# Patient Record
Sex: MALE | Race: BLACK OR AFRICAN AMERICAN | NOT HISPANIC OR LATINO | Employment: FULL TIME | URBAN - METROPOLITAN AREA
[De-identification: names, ages, dates, MRNs, and addresses within clinical notes are randomized per-mention and may not be internally consistent; named-entity substitution may affect disease eponyms.]

---

## 2024-10-22 ENCOUNTER — OFFICE VISIT (OUTPATIENT)
Dept: FAMILY MEDICINE | Facility: CLINIC | Age: 29
End: 2024-10-22
Payer: COMMERCIAL

## 2024-10-22 ENCOUNTER — LAB VISIT (OUTPATIENT)
Dept: LAB | Facility: HOSPITAL | Age: 29
End: 2024-10-22
Attending: FAMILY MEDICINE
Payer: COMMERCIAL

## 2024-10-22 ENCOUNTER — PATIENT OUTREACH (OUTPATIENT)
Dept: ADMINISTRATIVE | Facility: HOSPITAL | Age: 29
End: 2024-10-22
Payer: COMMERCIAL

## 2024-10-22 VITALS
OXYGEN SATURATION: 97 % | RESPIRATION RATE: 18 BRPM | SYSTOLIC BLOOD PRESSURE: 134 MMHG | WEIGHT: 315 LBS | HEART RATE: 86 BPM | HEIGHT: 66 IN | DIASTOLIC BLOOD PRESSURE: 90 MMHG | BODY MASS INDEX: 50.62 KG/M2

## 2024-10-22 DIAGNOSIS — Z20.2 CONTACT WITH AND (SUSPECTED) EXPOSURE TO INFECTIONS WITH A PREDOMINANTLY SEXUAL MODE OF TRANSMISSION: ICD-10-CM

## 2024-10-22 DIAGNOSIS — Z21 ASYMPTOMATIC HIV INFECTION, WITH NO HISTORY OF HIV-RELATED ILLNESS: Primary | Chronic | ICD-10-CM

## 2024-10-22 DIAGNOSIS — Z23 NEED FOR VACCINATION: ICD-10-CM

## 2024-10-22 DIAGNOSIS — Z21 ASYMPTOMATIC HIV INFECTION, WITH NO HISTORY OF HIV-RELATED ILLNESS: ICD-10-CM

## 2024-10-22 DIAGNOSIS — Z01.84 IMMUNITY STATUS TESTING: ICD-10-CM

## 2024-10-22 DIAGNOSIS — Z13.220 ENCOUNTER FOR LIPID SCREENING FOR CARDIOVASCULAR DISEASE: ICD-10-CM

## 2024-10-22 DIAGNOSIS — Z13.6 ENCOUNTER FOR LIPID SCREENING FOR CARDIOVASCULAR DISEASE: ICD-10-CM

## 2024-10-22 DIAGNOSIS — Z11.1 SCREENING FOR TUBERCULOSIS: ICD-10-CM

## 2024-10-22 DIAGNOSIS — A53.9 SYPHILIS: ICD-10-CM

## 2024-10-22 DIAGNOSIS — A53.9 SYPHILIS: Chronic | ICD-10-CM

## 2024-10-22 LAB
ALBUMIN SERPL BCP-MCNC: 4 G/DL (ref 3.5–5.2)
ALP SERPL-CCNC: 56 U/L (ref 40–150)
ALT SERPL W/O P-5'-P-CCNC: 26 U/L (ref 10–44)
ANION GAP SERPL CALC-SCNC: 10 MMOL/L (ref 8–16)
AST SERPL-CCNC: 20 U/L (ref 10–40)
BILIRUB SERPL-MCNC: 0.4 MG/DL (ref 0.1–1)
BUN SERPL-MCNC: 11 MG/DL (ref 6–20)
CALCIUM SERPL-MCNC: 9.6 MG/DL (ref 8.7–10.5)
CHLORIDE SERPL-SCNC: 104 MMOL/L (ref 95–110)
CHOLEST SERPL-MCNC: 154 MG/DL (ref 120–199)
CHOLEST/HDLC SERPL: 3.9 {RATIO} (ref 2–5)
CO2 SERPL-SCNC: 26 MMOL/L (ref 23–29)
CREAT SERPL-MCNC: 0.8 MG/DL (ref 0.5–1.4)
EST. GFR  (NO RACE VARIABLE): >60 ML/MIN/1.73 M^2
ESTIMATED AVG GLUCOSE: 111 MG/DL (ref 68–131)
GLUCOSE SERPL-MCNC: 111 MG/DL (ref 70–110)
HAV IGG SER QL IA: NORMAL
HBA1C MFR BLD: 5.5 % (ref 4–5.6)
HBV CORE AB SERPL QL IA: NORMAL
HBV SURFACE AB SER-ACNC: 4.45 MIU/ML
HBV SURFACE AB SER-ACNC: NORMAL M[IU]/ML
HBV SURFACE AG SERPL QL IA: NORMAL
HCV AB SERPL QL IA: REACTIVE
HDLC SERPL-MCNC: 40 MG/DL (ref 40–75)
HDLC SERPL: 26 % (ref 20–50)
HIV 1 AB: NORMAL
HIV 2 AB: NEGATIVE
LDLC SERPL CALC-MCNC: 93.6 MG/DL (ref 63–159)
NONHDLC SERPL-MCNC: 114 MG/DL
POTASSIUM SERPL-SCNC: 3.7 MMOL/L (ref 3.5–5.1)
PROT SERPL-MCNC: 8.4 G/DL (ref 6–8.4)
RPR TITER: NORMAL
SODIUM SERPL-SCNC: 140 MMOL/L (ref 136–145)
TREPONEMA PALLIDUM AB: POSITIVE
TRIGL SERPL-MCNC: 102 MG/DL (ref 30–150)
TSH SERPL DL<=0.005 MIU/L-ACNC: 0.96 UIU/ML (ref 0.4–4)

## 2024-10-22 PROCEDURE — 3044F HG A1C LEVEL LT 7.0%: CPT | Mod: CPTII,S$GLB,, | Performed by: FAMILY MEDICINE

## 2024-10-22 PROCEDURE — 86704 HEP B CORE ANTIBODY TOTAL: CPT | Performed by: FAMILY MEDICINE

## 2024-10-22 PROCEDURE — 3080F DIAST BP >= 90 MM HG: CPT | Mod: CPTII,S$GLB,, | Performed by: FAMILY MEDICINE

## 2024-10-22 PROCEDURE — 36415 COLL VENOUS BLD VENIPUNCTURE: CPT | Mod: PN | Performed by: FAMILY MEDICINE

## 2024-10-22 PROCEDURE — 87491 CHLMYD TRACH DNA AMP PROBE: CPT | Mod: 59 | Performed by: FAMILY MEDICINE

## 2024-10-22 PROCEDURE — 81381 HLA I TYPING 1 ALLELE HR: CPT | Performed by: FAMILY MEDICINE

## 2024-10-22 PROCEDURE — 86787 VARICELLA-ZOSTER ANTIBODY: CPT | Performed by: FAMILY MEDICINE

## 2024-10-22 PROCEDURE — 99204 OFFICE O/P NEW MOD 45 MIN: CPT | Mod: 25,S$GLB,, | Performed by: FAMILY MEDICINE

## 2024-10-22 PROCEDURE — 86790 VIRUS ANTIBODY NOS: CPT | Performed by: FAMILY MEDICINE

## 2024-10-22 PROCEDURE — 86592 SYPHILIS TEST NON-TREP QUAL: CPT | Performed by: FAMILY MEDICINE

## 2024-10-22 PROCEDURE — 83036 HEMOGLOBIN GLYCOSYLATED A1C: CPT | Performed by: FAMILY MEDICINE

## 2024-10-22 PROCEDURE — 86765 RUBEOLA ANTIBODY: CPT | Performed by: FAMILY MEDICINE

## 2024-10-22 PROCEDURE — 87536 HIV-1 QUANT&REVRSE TRNSCRPJ: CPT | Performed by: FAMILY MEDICINE

## 2024-10-22 PROCEDURE — 86593 SYPHILIS TEST NON-TREP QUANT: CPT | Performed by: FAMILY MEDICINE

## 2024-10-22 PROCEDURE — 90471 IMMUNIZATION ADMIN: CPT | Mod: S$GLB,,, | Performed by: FAMILY MEDICINE

## 2024-10-22 PROCEDURE — 86361 T CELL ABSOLUTE COUNT: CPT | Performed by: FAMILY MEDICINE

## 2024-10-22 PROCEDURE — 86762 RUBELLA ANTIBODY: CPT | Performed by: FAMILY MEDICINE

## 2024-10-22 PROCEDURE — 3008F BODY MASS INDEX DOCD: CPT | Mod: CPTII,S$GLB,, | Performed by: FAMILY MEDICINE

## 2024-10-22 PROCEDURE — 90656 IIV3 VACC NO PRSV 0.5 ML IM: CPT | Mod: S$GLB,,, | Performed by: FAMILY MEDICINE

## 2024-10-22 PROCEDURE — 86735 MUMPS ANTIBODY: CPT | Performed by: FAMILY MEDICINE

## 2024-10-22 PROCEDURE — 82955 ASSAY OF G6PD ENZYME: CPT | Performed by: FAMILY MEDICINE

## 2024-10-22 PROCEDURE — 80053 COMPREHEN METABOLIC PANEL: CPT | Performed by: FAMILY MEDICINE

## 2024-10-22 PROCEDURE — 3075F SYST BP GE 130 - 139MM HG: CPT | Mod: CPTII,S$GLB,, | Performed by: FAMILY MEDICINE

## 2024-10-22 PROCEDURE — 86803 HEPATITIS C AB TEST: CPT | Performed by: FAMILY MEDICINE

## 2024-10-22 PROCEDURE — 99999 PR PBB SHADOW E&M-NEW PATIENT-LVL III: CPT | Mod: PBBFAC,,, | Performed by: FAMILY MEDICINE

## 2024-10-22 PROCEDURE — 87900 PHENOTYPE INFECT AGENT DRUG: CPT | Performed by: FAMILY MEDICINE

## 2024-10-22 PROCEDURE — 86706 HEP B SURFACE ANTIBODY: CPT | Performed by: FAMILY MEDICINE

## 2024-10-22 PROCEDURE — 80061 LIPID PANEL: CPT | Performed by: FAMILY MEDICINE

## 2024-10-22 PROCEDURE — 87591 N.GONORRHOEAE DNA AMP PROB: CPT | Mod: 59 | Performed by: FAMILY MEDICINE

## 2024-10-22 PROCEDURE — 84443 ASSAY THYROID STIM HORMONE: CPT | Performed by: FAMILY MEDICINE

## 2024-10-22 PROCEDURE — 87340 HEPATITIS B SURFACE AG IA: CPT | Performed by: FAMILY MEDICINE

## 2024-10-22 RX ORDER — BICTEGRAVIR SODIUM, EMTRICITABINE, AND TENOFOVIR ALAFENAMIDE FUMARATE 50; 200; 25 MG/1; MG/1; MG/1
1 TABLET ORAL DAILY
Qty: 30 TABLET | Refills: 1 | Status: ACTIVE | OUTPATIENT
Start: 2024-10-22

## 2024-10-23 PROBLEM — Z21 ASYMPTOMATIC HIV INFECTION, WITH NO HISTORY OF HIV-RELATED ILLNESS: Status: ACTIVE | Noted: 2024-10-23

## 2024-10-23 LAB
CD3+CD4+ CELLS # BLD: 652 CELLS/UL (ref 300–1400)
CD3+CD4+ CELLS NFR BLD: 24.2 % (ref 28–57)
G6PD RBC-CCNT: 9.7 U/G HB (ref 8–11.9)
HIV1 RNA # SERPL NAA+PROBE: ABNORMAL COPIES/ML
HIV1 RNA SERPL NAA+PROBE-LOG#: 4.43 LOG COPIES/ML
HIV1 RNA SERPL QL NAA+PROBE: DETECTED
MUMPS IGG INTERPRETATION: NEGATIVE
MUMPS IGG SCREEN: <5 AU/ML
RPR SER QL: REACTIVE
RPR SER-TITR: ABNORMAL {TITER}
RUBEOLA IGG ANTIBODY: >300 AU/ML
RUBEOLA INTERPRETATION: POSITIVE
VARICELLA INTERPRETATION: NEGATIVE
VARICELLA ZOSTER IGG: 86.9

## 2024-10-24 LAB
C TRACH RRNA SPEC QL NAA+PROBE: NEGATIVE
C TRACH RRNA SPEC QL NAA+PROBE: POSITIVE
N GONORRHOEA RRNA SPEC QL NAA+PROBE: NEGATIVE
N GONORRHOEA RRNA SPEC QL NAA+PROBE: NEGATIVE
N.GONORROHEAE, AMP RNA SOURCE: ABNORMAL
N.GONORROHEAE, AMP RNA SOURCE: NORMAL
RUBV IGG SER-ACNC: 10.2 IU/ML
RUBV IGG SER-IMP: REACTIVE
SPECIMEN SOURCE: ABNORMAL
SPECIMEN SOURCE: NORMAL

## 2024-10-27 PROBLEM — Z21 ASYMPTOMATIC HIV INFECTION, WITH NO HISTORY OF HIV-RELATED ILLNESS: Chronic | Status: ACTIVE | Noted: 2024-10-23

## 2024-10-27 PROBLEM — A53.9 SYPHILIS: Chronic | Status: ACTIVE | Noted: 2024-10-27

## 2024-11-01 LAB — HIV GENTYP ISLT: DETECTED

## 2024-11-03 DIAGNOSIS — A56.3 CHLAMYDIA TRACHOMATIS INFECTION OF ANUS AND RECTUM: Primary | ICD-10-CM

## 2024-11-03 DIAGNOSIS — A53.9 SYPHILIS: ICD-10-CM

## 2024-11-03 DIAGNOSIS — R76.8 HEPATITIS C ANTIBODY POSITIVE IN BLOOD: ICD-10-CM

## 2024-11-03 RX ORDER — DOXYCYCLINE HYCLATE 100 MG
100 TABLET ORAL 2 TIMES DAILY
Qty: 14 TABLET | Refills: 0 | Status: SHIPPED | OUTPATIENT
Start: 2024-11-03 | End: 2024-11-04

## 2024-11-04 ENCOUNTER — TELEPHONE (OUTPATIENT)
Dept: FAMILY MEDICINE | Facility: CLINIC | Age: 29
End: 2024-11-04
Payer: COMMERCIAL

## 2024-11-04 NOTE — TELEPHONE ENCOUNTER
Left voice message for patient to return call to discuss lab results.  ----- Message from Bert Jordan MD sent at 11/4/2024 12:04 AM CST -----  Please call patient let him know that his lab tests show positive rectal chlamydia.  I will send in a prescription for doxycycline to be taken twice a day for seven days. However, I need to know which is his local pharmacy.    Also labs showed positive hepatitis-C antibody test and we need to do further testing.  Would like him to have a hepatitis-C viral load, which is ordered, done in the next two weeks so results can be back by the time I see him.    Also, patient needs to be scheduled for nurse visits for weekly Bicillin 2.4 million units x 3 weeks.  Yumiko ordered this, please confirm with her when office will have it.    Hepatitis-C lab can be done when first Bicillin dose done

## 2024-11-04 NOTE — TELEPHONE ENCOUNTER
Local pharmacy is [facility] on [address] [place] [id number].----- Message from Med Assistant Sainz sent at 11/4/2024 11:30 AM CST -----  Type:  Patient Returning Call    Who Called:  Pt   Who Left Message for Patient:  Malorie Davis MA  Does the patient know what this is regarding?:    Would the patient rather a call back or a response via [facility]?  Call back  Callback   Best Call Back Number:  Telephone Information:  Mobile          [phone]     Additional Information:    Thank you.

## 2024-11-13 ENCOUNTER — PATIENT MESSAGE (OUTPATIENT)
Dept: RESEARCH | Facility: HOSPITAL | Age: 29
End: 2024-11-13
Payer: COMMERCIAL

## 2024-11-16 ENCOUNTER — PATIENT MESSAGE (OUTPATIENT)
Dept: ADMINISTRATIVE | Facility: OTHER | Age: 29
End: 2024-11-16
Payer: COMMERCIAL

## 2024-11-20 ENCOUNTER — LAB VISIT (OUTPATIENT)
Dept: LAB | Facility: HOSPITAL | Age: 29
End: 2024-11-20
Attending: FAMILY MEDICINE
Payer: COMMERCIAL

## 2024-11-20 DIAGNOSIS — R76.8 HEPATITIS C ANTIBODY POSITIVE IN BLOOD: ICD-10-CM

## 2024-11-20 PROCEDURE — 36415 COLL VENOUS BLD VENIPUNCTURE: CPT | Mod: PN | Performed by: FAMILY MEDICINE

## 2024-11-20 PROCEDURE — 87522 HEPATITIS C REVRS TRNSCRPJ: CPT | Performed by: FAMILY MEDICINE

## 2024-11-22 LAB
HCV RNA SERPL QL NAA+PROBE: NOT DETECTED
HCV RNA SPEC NAA+PROBE-ACNC: NOT DETECTED IU/ML

## 2024-11-29 ENCOUNTER — PATIENT MESSAGE (OUTPATIENT)
Dept: FAMILY MEDICINE | Facility: CLINIC | Age: 29
End: 2024-11-29
Payer: COMMERCIAL

## 2024-12-04 ENCOUNTER — LAB VISIT (OUTPATIENT)
Dept: LAB | Facility: HOSPITAL | Age: 29
End: 2024-12-04
Attending: FAMILY MEDICINE
Payer: COMMERCIAL

## 2024-12-04 ENCOUNTER — OFFICE VISIT (OUTPATIENT)
Dept: FAMILY MEDICINE | Facility: CLINIC | Age: 29
End: 2024-12-04
Payer: COMMERCIAL

## 2024-12-04 VITALS
RESPIRATION RATE: 18 BRPM | DIASTOLIC BLOOD PRESSURE: 78 MMHG | SYSTOLIC BLOOD PRESSURE: 114 MMHG | OXYGEN SATURATION: 98 % | HEART RATE: 85 BPM | WEIGHT: 315 LBS | BODY MASS INDEX: 50.62 KG/M2 | HEIGHT: 66 IN

## 2024-12-04 DIAGNOSIS — Z21 ASYMPTOMATIC HIV INFECTION, WITH NO HISTORY OF HIV-RELATED ILLNESS: Primary | Chronic | ICD-10-CM

## 2024-12-04 DIAGNOSIS — E66.813 CLASS 3 SEVERE OBESITY DUE TO EXCESS CALORIES WITH SERIOUS COMORBIDITY AND BODY MASS INDEX (BMI) OF 50.0 TO 59.9 IN ADULT: Chronic | ICD-10-CM

## 2024-12-04 DIAGNOSIS — E66.01 CLASS 3 SEVERE OBESITY DUE TO EXCESS CALORIES WITH SERIOUS COMORBIDITY AND BODY MASS INDEX (BMI) OF 50.0 TO 59.9 IN ADULT: Chronic | ICD-10-CM

## 2024-12-04 DIAGNOSIS — R76.8 HEPATITIS C ANTIBODY POSITIVE IN BLOOD: Chronic | ICD-10-CM

## 2024-12-04 DIAGNOSIS — Z21 ASYMPTOMATIC HIV INFECTION, WITH NO HISTORY OF HIV-RELATED ILLNESS: Chronic | ICD-10-CM

## 2024-12-04 DIAGNOSIS — A53.9 SYPHILIS: Chronic | ICD-10-CM

## 2024-12-04 LAB
ALBUMIN SERPL BCP-MCNC: 3.9 G/DL (ref 3.5–5.2)
ALP SERPL-CCNC: 54 U/L (ref 40–150)
ALT SERPL W/O P-5'-P-CCNC: 27 U/L (ref 10–44)
ANION GAP SERPL CALC-SCNC: 10 MMOL/L (ref 8–16)
AST SERPL-CCNC: 18 U/L (ref 10–40)
BASOPHILS # BLD AUTO: 0.03 K/UL (ref 0–0.2)
BASOPHILS NFR BLD: 0.4 % (ref 0–1.9)
BILIRUB SERPL-MCNC: 0.5 MG/DL (ref 0.1–1)
BUN SERPL-MCNC: 11 MG/DL (ref 6–20)
CALCIUM SERPL-MCNC: 9.7 MG/DL (ref 8.7–10.5)
CHLORIDE SERPL-SCNC: 100 MMOL/L (ref 95–110)
CO2 SERPL-SCNC: 28 MMOL/L (ref 23–29)
CREAT SERPL-MCNC: 0.8 MG/DL (ref 0.5–1.4)
DIFFERENTIAL METHOD BLD: NORMAL
EOSINOPHIL # BLD AUTO: 0.2 K/UL (ref 0–0.5)
EOSINOPHIL NFR BLD: 2.3 % (ref 0–8)
ERYTHROCYTE [DISTWIDTH] IN BLOOD BY AUTOMATED COUNT: 14 % (ref 11.5–14.5)
EST. GFR  (NO RACE VARIABLE): >60 ML/MIN/1.73 M^2
GLUCOSE SERPL-MCNC: 97 MG/DL (ref 70–110)
HCT VFR BLD AUTO: 42.8 % (ref 40–54)
HGB BLD-MCNC: 14 G/DL (ref 14–18)
IMM GRANULOCYTES # BLD AUTO: 0.02 K/UL (ref 0–0.04)
IMM GRANULOCYTES NFR BLD AUTO: 0.3 % (ref 0–0.5)
LYMPHOCYTES # BLD AUTO: 2.8 K/UL (ref 1–4.8)
LYMPHOCYTES NFR BLD: 38.5 % (ref 18–48)
MCH RBC QN AUTO: 29.7 PG (ref 27–31)
MCHC RBC AUTO-ENTMCNC: 32.7 G/DL (ref 32–36)
MCV RBC AUTO: 91 FL (ref 82–98)
MONOCYTES # BLD AUTO: 0.6 K/UL (ref 0.3–1)
MONOCYTES NFR BLD: 8 % (ref 4–15)
NEUTROPHILS # BLD AUTO: 3.7 K/UL (ref 1.8–7.7)
NEUTROPHILS NFR BLD: 50.5 % (ref 38–73)
NRBC BLD-RTO: 0 /100 WBC
PLATELET # BLD AUTO: 208 K/UL (ref 150–450)
PMV BLD AUTO: 10.1 FL (ref 9.2–12.9)
POTASSIUM SERPL-SCNC: 4 MMOL/L (ref 3.5–5.1)
PROT SERPL-MCNC: 8.4 G/DL (ref 6–8.4)
RBC # BLD AUTO: 4.72 M/UL (ref 4.6–6.2)
SODIUM SERPL-SCNC: 138 MMOL/L (ref 136–145)
WBC # BLD AUTO: 7.28 K/UL (ref 3.9–12.7)

## 2024-12-04 PROCEDURE — 99214 OFFICE O/P EST MOD 30 MIN: CPT | Mod: 25,S$GLB,, | Performed by: FAMILY MEDICINE

## 2024-12-04 PROCEDURE — 1159F MED LIST DOCD IN RCRD: CPT | Mod: CPTII,S$GLB,, | Performed by: FAMILY MEDICINE

## 2024-12-04 PROCEDURE — 99999 PR PBB SHADOW E&M-EST. PATIENT-LVL IV: CPT | Mod: PBBFAC,,, | Performed by: FAMILY MEDICINE

## 2024-12-04 PROCEDURE — 36415 COLL VENOUS BLD VENIPUNCTURE: CPT | Mod: PN | Performed by: FAMILY MEDICINE

## 2024-12-04 PROCEDURE — 3044F HG A1C LEVEL LT 7.0%: CPT | Mod: CPTII,S$GLB,, | Performed by: FAMILY MEDICINE

## 2024-12-04 PROCEDURE — 3008F BODY MASS INDEX DOCD: CPT | Mod: CPTII,S$GLB,, | Performed by: FAMILY MEDICINE

## 2024-12-04 PROCEDURE — 1160F RVW MEDS BY RX/DR IN RCRD: CPT | Mod: CPTII,S$GLB,, | Performed by: FAMILY MEDICINE

## 2024-12-04 PROCEDURE — 87536 HIV-1 QUANT&REVRSE TRNSCRPJ: CPT | Performed by: FAMILY MEDICINE

## 2024-12-04 PROCEDURE — 3074F SYST BP LT 130 MM HG: CPT | Mod: CPTII,S$GLB,, | Performed by: FAMILY MEDICINE

## 2024-12-04 PROCEDURE — 96372 THER/PROPH/DIAG INJ SC/IM: CPT | Mod: S$GLB,,, | Performed by: FAMILY MEDICINE

## 2024-12-04 PROCEDURE — 85025 COMPLETE CBC W/AUTO DIFF WBC: CPT | Performed by: FAMILY MEDICINE

## 2024-12-04 PROCEDURE — 80053 COMPREHEN METABOLIC PANEL: CPT | Performed by: FAMILY MEDICINE

## 2024-12-04 PROCEDURE — 86361 T CELL ABSOLUTE COUNT: CPT | Performed by: FAMILY MEDICINE

## 2024-12-04 PROCEDURE — 3078F DIAST BP <80 MM HG: CPT | Mod: CPTII,S$GLB,, | Performed by: FAMILY MEDICINE

## 2024-12-04 NOTE — PROGRESS NOTES
"  Patient Name: Cherie Moya    : 1995  MRN: [id number]      Subjective:     Patient ID: Cherie is a 29 y.o. male    Chief Complaint:  HIV Positive/AIDS    History of Present Illness    Cherie presents today for follow-up on HIV since starting treatment.    He reports this Biktarvy.    His recent labs were positive for syphilis and he is due for treatment      Review of Systems   Constitutional:  Negative for unexpected weight change.   HENT:  Negative for ear pain and sore throat.    Eyes:  Negative for visual disturbance.   Respiratory:  Negative for shortness of breath.    Cardiovascular:  Negative for chest pain.   Gastrointestinal:  Negative for abdominal pain and blood in stool.   Genitourinary:  Negative for dysuria and frequency.   Neurological:  Negative for weakness, numbness and headaches.   Hematological:  Negative for adenopathy.   Psychiatric/Behavioral:  Negative for suicidal ideas.         Objective:   /78 (BP Location: Left arm, Patient Position: Sitting)   Pulse 85   Resp 18   Ht 5' 6" (1.676 m)   Wt (!) 152.9 kg (337 lb 1.3 oz)   SpO2 98%   BMI 54.41 kg/m²     Physical Exam  Vitals reviewed.   Constitutional:       General: He is not in acute distress.  HENT:      Head: Normocephalic and atraumatic.      Right Ear: Ear canal and external ear normal.      Left Ear: Ear canal and external ear normal.      Nose: Nose normal.      Mouth/Throat:      Mouth: Mucous membranes are moist.      Pharynx: No oropharyngeal exudate or posterior oropharyngeal erythema.   Eyes:      Extraocular Movements: Extraocular movements intact.      Conjunctiva/sclera: Conjunctivae normal.      Pupils: Pupils are equal, round, and reactive to light.   Cardiovascular:      Rate and Rhythm: Normal rate and regular rhythm.      Pulses: Normal pulses.      Heart sounds: Normal heart sounds.   Pulmonary:      Effort: Pulmonary effort is normal. No respiratory distress.      Breath sounds: No wheezing or " rales.   Abdominal:      General: Abdomen is flat. Bowel sounds are normal. There is no distension.      Palpations: Abdomen is soft.      Tenderness: There is no abdominal tenderness. There is no guarding.   Musculoskeletal:      Cervical back: Normal range of motion. No rigidity or tenderness.   Lymphadenopathy:      Cervical: No cervical adenopathy.   Skin:     General: Skin is warm.      Capillary Refill: Capillary refill takes less than 2 seconds.   Neurological:      Mental Status: He is alert and oriented to person, place, and time.      Cranial Nerves: No cranial nerve deficit.      Sensory: No sensory deficit.   Psychiatric:         Mood and Affect: Mood normal.         Behavior: Behavior normal.           Lab Visit on 12/04/2024   Component Date Value Ref Range Status    HIV-1 ULTRAQUANT 12/04/2024 <20 (A)  <20 Copies/mL Final    Log HIV Copies/mL 12/04/2024 <1.30 (A)  <1.30 Log Copies/mL Final    HIV-1 RNA, QUALITATIVE 12/04/2024 Detected (A)  Not Detected Final    Comment: This procedure utilizes a real-time reverse-transcriptase polymerase  chain reaction test from [employer] to amplify a portion of the  haleigh/integrase region of the HIV-1 genome. The test may be used to aid  in assessing the viral response to antiretroviral treatment as  measured by changes in plasma HIV-1 RNA levels. This test should not  be used to establish a diagnosis of HIV-1 infection. The lower limit  of quantitation is <20 Copies/mL (<1.30 Log IU/mL)and the upper limit  of quantitation is 6 million Copies/mL (6.78 Log IU/mL).     Specimens reported as Detected but <20 Copies/mL contain detectable  levels of HIV-1 RNA but the viral load is below the limit of   quantitation. A Not Detected result does not rule out HIV-1   infection,   clinical correlation is recommended.      Sodium 12/04/2024 138  136 - 145 mmol/L Final    Potassium 12/04/2024 4.0  3.5 - 5.1 mmol/L Final    Chloride 12/04/2024 100  95 - 110 mmol/L Final     CO2 12/04/2024 28  23 - 29 mmol/L Final    Glucose 12/04/2024 97  70 - 110 mg/dL Final    BUN 12/04/2024 11  6 - 20 mg/dL Final    Creatinine 12/04/2024 0.8  0.5 - 1.4 mg/dL Final    Calcium 12/04/2024 9.7  8.7 - 10.5 mg/dL Final    Total Protein 12/04/2024 8.4  6.0 - 8.4 g/dL Final    Albumin 12/04/2024 3.9  3.5 - 5.2 g/dL Final    Total Bilirubin 12/04/2024 0.5  0.1 - 1.0 mg/dL Final    Comment: For infants and newborns, interpretation of results should be based  on gestational age, weight and in agreement with clinical  observations.    Premature Infant recommended reference ranges:  Up to 24 hours.............<8.0 mg/dL  Up to 48 hours............<12.0 mg/dL  3-5 days..................<15.0 mg/dL  6-29 days.................<15.0 mg/dL      Alkaline Phosphatase 12/04/2024 54  40 - 150 U/L Final    AST 12/04/2024 18  10 - 40 U/L Final    ALT 12/04/2024 27  10 - 44 U/L Final    eGFR 12/04/2024 >60  >60 mL/min/1.73 m^2 Final    Anion Gap 12/04/2024 10  8 - 16 mmol/L Final    CD4 % [place] T Cell 12/04/2024 26.8 (L)  28 - 57 % Final    Absolute CD4 12/04/2024 742  300 - 1400 cells/ul Final    Comment: This test was developed and its performance characteristics   determined by [facility].    It has not been cleared or approved by the U.S. Food and Drug   Administration. The FDA has determined that such clearance or   approval is not necessary.  This test is used for clinical purposes.    It should not be regarded as investigational or for research.  This   laboratory is certified under CLIA-88 as qualified to perform high   complexity clinical laboratory testing.      WBC 12/04/2024 7.28  3.90 - 12.70 K/uL Final    RBC 12/04/2024 4.72  4.60 - 6.20 M/uL Final    Hemoglobin 12/04/2024 14.0  14.0 - 18.0 g/dL Final    Hematocrit 12/04/2024 42.8  40.0 - 54.0 % Final    MCV 12/04/2024 91  82 - 98 fL Final    MCH 12/04/2024 29.7  27.0 - 31.0 pg Final    MCHC 12/04/2024 32.7  32.0 -  36.0 g/dL Final    RDW 12/04/2024 14.0  11.5 - 14.5 % Final    Platelets 12/04/2024 208  150 - 450 K/uL Final    MPV 12/04/2024 10.1  9.2 - 12.9 fL Final    Immature Granulocytes 12/04/2024 0.3  0.0 - 0.5 % Final    Gran # (ANC) 12/04/2024 3.7  1.8 - 7.7 K/uL Final    Immature Grans (Abs) 12/04/2024 0.02  0.00 - 0.04 K/uL Final    Comment: Mild elevation in immature granulocytes is non specific and   can be seen in a variety of conditions including stress response,   acute inflammation, trauma and pregnancy. Correlation with other   laboratory and clinical findings is essential.      Lymph # 12/04/2024 2.8  1.0 - 4.8 K/uL Final    Mono # 12/04/2024 0.6  0.3 - 1.0 K/uL Final    Eos # 12/04/2024 0.2  0.0 - 0.5 K/uL Final    Baso # 12/04/2024 0.03  0.00 - 0.20 K/uL Final    nRBC 12/04/2024 0  0 /100 WBC Final    Gran % 12/04/2024 50.5  38.0 - 73.0 % Final    Lymph % 12/04/2024 38.5  18.0 - 48.0 % Final    Mono % 12/04/2024 8.0  4.0 - 15.0 % Final    Eosinophil % 12/04/2024 2.3  0.0 - 8.0 % Final    Basophil % 12/04/2024 0.4  0.0 - 1.9 % Final    Differential Method 12/04/2024 Automated   Final   Lab Visit on 11/20/2024   Component Date Value Ref Range Status    HCV Quantitative Result 11/20/2024 Not Detected  <12 IU/mL Final    HCV, Qualitative 11/20/2024 Not Detected  Not Detected Final    Comment: This procedure utilizes a real-time polymerase chain reaction test  from [facility]. The amplification target is a conserved region   of the HCV genome. The lower limit of quantitation is <12 IU/mL   (<1.08 Log IU/mL)and the upper limit of quantitation is 30 million   IU/mL (7.48 Log IU/mL).     Specimens reported as Detected but <12 IU/mL contain detectable  levels of Hepatitis C RNA but the viral load is below the limit of   quantitation. A Not Detected result does not rule out HCV infection,   clinical correlation is recommended.     Lab Visit on 10/22/2024   Component Date Value Ref Range Status    Chlamydia,  Amplified DNA 10/22/2024 Not Detected  Not Detected Final    N gonorrhoeae, amplified DNA 10/22/2024 Not Detected  Not Detected Final   Lab Visit on 10/22/2024   Component Date Value Ref Range Status    NIL 10/22/2024 0.35490  IU/mL Final    Comment: The Nil tube value is used to determine if the patient   has a preexisting immune response which could cause a   false-positive reading on the test.   For a test to be valid, the NIL tube must have a value   of less than or equal to 8.0 IU/mL.  The mitogen control tube is used to assure the patient   has a healthy immune status and also serves as a control   for correct blood handling and incubation. It is used to   detect false negative readings.   The TB antigen tubes are coated with the M. tuberculosis   specific antigens. For a test to be considered positive,   at least one of the TB antigen tube values minus the Nil   tube value must be greater than or equal to 0.35 IU/mL   and be greater than or equal to 25% of the Nil tube value.  Diagnosing or excluding tuberculosis disease, and assessing   the probability of LTNI, requires a combination of   epidemiological, historical, medical, and diagnostic   findings that should be considered when interpreting   the test results.       TB1 - Nil 10/22/2024 -0.010  IU/mL Final    TB2 - Nil 10/22/2024 0.027  IU/mL Final    Mitogen - Nil 10/22/2024 9.842  IU/mL Final    TB Gold Plus 10/22/2024 Negative  Negative Final    M. tuberculosis infection NOT likely.   Lab Visit on 10/22/2024   Component Date Value Ref Range Status    CD4 % [place] T Cell 10/22/2024 24.2 (L)  28 - 57 % Final    Absolute CD4 10/22/2024 652  300 - 1400 cells/ul Final    Comment: This test was developed and its performance characteristics   determined by [facility].    It has not been cleared or approved by the U.S. Food and Drug   Administration. The FDA has determined that such clearance or   approval is not  necessary.  This test is used for clinical purposes.    It should not be regarded as investigational or for research.  This   laboratory is certified under CLIA-88 as qualified to perform high   complexity clinical laboratory testing.      Sodium 10/22/2024 140  136 - 145 mmol/L Final    Potassium 10/22/2024 3.7  3.5 - 5.1 mmol/L Final    Chloride 10/22/2024 104  95 - 110 mmol/L Final    CO2 10/22/2024 26  23 - 29 mmol/L Final    Glucose 10/22/2024 111 (H)  70 - 110 mg/dL Final    BUN 10/22/2024 11  6 - 20 mg/dL Final    Creatinine 10/22/2024 0.8  0.5 - 1.4 mg/dL Final    Calcium 10/22/2024 9.6  8.7 - 10.5 mg/dL Final    Total Protein 10/22/2024 8.4  6.0 - 8.4 g/dL Final    Albumin 10/22/2024 4.0  3.5 - 5.2 g/dL Final    Total Bilirubin 10/22/2024 0.4  0.1 - 1.0 mg/dL Final    Comment: For infants and newborns, interpretation of results should be based  on gestational age, weight and in agreement with clinical  observations.    Premature Infant recommended reference ranges:  Up to 24 hours.............<8.0 mg/dL  Up to 48 hours............<12.0 mg/dL  3-5 days..................<15.0 mg/dL  6-29 days.................<15.0 mg/dL      Alkaline Phosphatase 10/22/2024 56  40 - 150 U/L Final    AST 10/22/2024 20  10 - 40 U/L Final    ALT 10/22/2024 26  10 - 44 U/L Final    eGFR 10/22/2024 >60  >60 mL/min/1.73 m^2 Final    Anion Gap 10/22/2024 10  8 - 16 mmol/L Final    G6PD Quant 10/22/2024 9.7  8.0 - 11.9 U/g Hb Final    Comment: The G6PD activity level is expected to be decreased in the  setting of G6PD deficiency. However, G6PD enzyme activity   levels can be increased in the setting of reticulocytosis  or markedly elevated WBCs. Clinical correlation is required  to establish if there is a possibility of a masked G6PD   deficiency, particularly in the setting of ,   chronic, or episodic jaundice/anemia. In addition, enzyme  testing is not reliable in the setting of recent red cell  transfusion. If desired,  genotyping is available G6PDZ/  G6PD Full Gene Sequencing, V.    -------------------ADDITIONAL INFORMATION-------------------  This test was developed and its performance characteristics   determined by [facility] in a manner consistent with   CLIA requirements. This test has not been cleared or   approved by the U.S. Food and Drug Administration.    Test Performed by:  [facility] - [address] [address]  : Lilly Hernandez Ph.D.;                            CLIA# [id number]      Hemoglobin A1C 10/22/2024 5.5  4.0 - 5.6 % Final    Comment: ADA Screening Guidelines:  5.7-6.4%  Consistent with prediabetes  >or=6.5%  Consistent with diabetes    High levels of fetal hemoglobin interfere with the HbA1C  assay. Heterozygous hemoglobin variants (HbS, HgC, etc)do  not significantly interfere with this assay.   However, presence of multiple variants may affect accuracy.      Estimated Avg Glucose 10/22/2024 111  68 - 131 mg/dL Final    Hepatitis A Antibody IgG 10/22/2024 Non-reactive   Final    IgG anti-HAV not detected.    Hep B Core Total Ab 10/22/2024 Non-reactive  Non-reactive Final    Hep B S Ab 10/22/2024 4.45  mIU/mL Final    Hep B S Ab 10/22/2024 Non-reactive   Final    Individual is considered not immune to HBV infection.    Hepatitis B Surface Ag 10/22/2024 Non-reactive  Non-reactive Final    Hepatitis C Ab 10/22/2024 Reactive (A)  Non-reactive Final    Comment: Presumptive evidence of antibodies to HCV; recommend   supplemental testing HCV RNA Quantitative by PCR   ([id number]) if clinically indicated.      HIV-1 ULTRAQUANT 10/22/2024 66243 (A)  <20 Copies/mL Final    Log HIV Copies/mL 10/22/2024 4.43 (A)  <1.30 Log Copies/mL Final    HIV-1 RNA, QUALITATIVE 10/22/2024 Detected (A)  Not Detected Final    Comment: This procedure utilizes a real-time reverse-transcriptase polymerase  chain reaction test from [employer] to amplify a portion  of the  haleigh/integrase region of the HIV-1 genome. The test may be used to aid  in assessing the viral response to antiretroviral treatment as  measured by changes in plasma HIV-1 RNA levels. This test should not  be used to establish a diagnosis of HIV-1 infection. The lower limit  of quantitation is <20 Copies/mL (<1.30 Log IU/mL)and the upper limit  of quantitation is 6 million Copies/mL (6.78 Log IU/mL).     Specimens reported as Detected but <20 Copies/mL contain detectable  levels of HIV-1 RNA but the viral load is below the limit of   quantitation. A Not Detected result does not rule out HIV-1   infection,   clinical correlation is recommended.      Cholesterol 10/22/2024 154  120 - 199 mg/dL Final    Comment: The National Cholesterol Education Program (NCEP) has set the  following guidelines (reference ranges) for Cholesterol:  Optimal.....................<200 mg/dL  Borderline High.............200-239 mg/dL  High........................> or = 240 mg/dL      Triglycerides 10/22/2024 102  30 - 150 mg/dL Final    Comment: The National Cholesterol Education Program (NCEP) has set the  following guidelines (reference values) for triglycerides:  Normal......................<150 mg/dL  Borderline High.............150-199 mg/dL  High........................200-499 mg/dL      HDL 10/22/2024 40  40 - 75 mg/dL Final    Comment: The National Cholesterol Education Program (NCEP) has set the  following guidelines (reference values) for HDL Cholesterol:  Low...............<40 mg/dL  Optimal...........>60 mg/dL      LDL Cholesterol 10/22/2024 93.6  63.0 - 159.0 mg/dL Final    Comment: The National Cholesterol Education Program (NCEP) has set the  following guidelines (reference values) for LDL Cholesterol:  Optimal.......................<130 mg/dL  Borderline High...............130-159 mg/dL  High..........................160-189 mg/dL  Very High.....................>190 mg/dL      HDL/Cholesterol Ratio 10/22/2024 26.0   20.0 - 50.0 % Final    Total Cholesterol/HDL Ratio 10/22/2024 3.9  2.0 - 5.0 Final    Non-HDL Cholesterol 10/22/2024 114  mg/dL Final    Comment: Risk category and Non-HDL cholesterol goals:  Coronary heart disease (CHD)or equivalent (10-year risk of CHD >20%):  Non-HDL cholesterol goal     <130 mg/dL  Two or more CHD risk factors and 10-year risk of CHD <= 20%:  Non-HDL cholesterol goal     <160 mg/dL  0 to 1 CHD risk factor:  Non-HDL cholesterol goal     <190 mg/dL      Mumps IgG Screen 10/22/2024 <5.00  AU/ml Final    Mumps IgG Interpretation 10/22/2024 Negative   Final    Comment: Absence of detectable mumps virus IgG antibodies. A   negative result generally indicates that the patient   has not been infected and is susceptible to mumps.   If the subject has no history of mumps, has not   been previously vaccinated and exposure to mumps   virus is suspected despite a negative finding, a   second sample should be collected and tested no   less than one to two weeks later.       Rubeola IgG 10/22/2024 >300.00  AU/ml Final    Rubeola Interpretation 10/22/2024 Positive   Final    Comment: Presence of detectable measles virus IgG antibodies.   A positive result generally indicates exposure to   measles virus or previous vaccination.      Rubella IgG Antibodies 10/22/2024 10.2  >=10.0 IU/mL Final    Rubella Immune Status 10/22/2024 Reactive   Final    Comment: 0.0-4.9 IU/mL  Nonreactive (Non-Immune)  5.0-9.9 IU/mL  Indeterminate  10.0-400.0 IU/mL Reactive (Immune)    These results were obtained with the IMMULITE 2000 Rubella  Quantitative IgG assay. Values obtained from other   manufacturers' assay methods may not be used interchangeably.       TSH 10/22/2024 0.960  0.400 - 4.000 uIU/mL Final    Varicella Zoster IgG 10/22/2024 86.90   Final    Varicella Interpretation 10/22/2024 Negative   Final    Comment: The assay performance in detecting antibodies to VZV   in individuals vaccinated with the FDA-licensed  VZV  vaccine is unknown.  Absence of detectable VZV IgG antibodies. A negative   result indicates no detectable VZV antibody but does not   rule out acute infection. It should be noted that   the test usually scores negative in infected patients   during the incubation period and the early stages of   infection. If exposure to varicella-zoster virus is   suspected, despite a negative finding, a second sample   should be collected and tested no less than one or   two weeks later.       Interpretation 10/22/2024    Final                    Value:TAQ: [id number]  SAPE: 222       Testing Date 10/22/2024 11/04/2024 07:45 AM   Final     Result 10/22/2024 Negative   Final    Comment: These tests are not cleared or approved by the U.S. FDA, but such   approval is not required since this laboratory is certified by CLIA   (#[id number]) and the American Society for Histocompatibility and   Immunogenetics (09-3-PI-02-01) to perform high complexity testing.    [facility] is under the direction of TRISTAN Kim MD, TOREY.   Details of test procedures may be obtained by calling the Laboratory   at  [phone].  HLA typing was performed by SSO (immunofluorescent detection) and/or   PCR amplification SSP/RT-PCR molecular techniques. These tests have   been developed by [employer] (a division of [employer])   and/or [employer]( A division of [facility]).  All procedures and reagents   have been validated and performance characteristics determined, by   [facility].    Documentation available upon request.       RPR 10/22/2024 Reactive (A)   Final    HIV-1 GenotypeR PLUS 10/22/2024 DETECTED (A)   Final    Comment: HIV Subtype: B  ___________________________________________________________  Antiretroviral drugs      Resistance  Mutations  Detected  Predicted  ___________________________________________________________    NRTIs                            ZDV (zidovudine or Retrovir)      NO       ABC (abacavir or Ziagen)          NO       ddI (didanosine or Videx)         NO       3TC (lamivudine or Epivir)        NO       FTC (emtricitabine or Emtriva)    NO       d4T (stavudine or Zerit)          NO       TDF (tenofovir or Viread)         NO       ________________________________ ___ ______________________    NNRTIs                           ETR (etravirine or [employer]ence)     NO       EFV (efavirenz or Sustiva)        NO       NVP (nevirapine or Viramune)      NO       RPV (rilpivirine or Edurant)      NO       SHANA (doravirine or Pifeltro)      NO       ________________________________ ___ ______________________    PIs                              FPV (fos-amprenavir or Lexiva)    NO       IDV (in                           dinavir or Crixivan)       NO       NFV (nelfinavir or Viracept)      NO       SQV (saquinavir or Invirase)      NO       LPV (lopinavir or Kaletra)        NO       ATV (atazanavir or Reyataz)       NO       TPV (tipranavir or Aptivus)       NO       DRV (darunavir or Prezista)       NO         ________________________________ ___ ______________________  PRB = PROBABLE OR EMERGING RESISTANCE  OTHER MUTATIONS DETECTED:  RT GENE MUTATIONS: R211K  IN GENE MUTATIONS: I13V,L63P,I64V/L,A71T/A,V77I  ___________________________________________________________  The [employer] Sep 2022 Interpretation Algorithm  The method used in this test is RT-PCR and sequencing   of the HIV-1 polymerase gene.    The phrases  resistance predicted  and  probable   or emerging resistance  refer to the application of   the interpretive rules. The FDA has not reviewed  all of the interpretive rules used by the laboratory   to predict drug resistance. FDA may not currently   recognize some of the HIV gene mu                           tations reported as    predictive of drug resistance, but the laboratory   considers these mutations to be associated with   resistance to anti-viral drugs based on current   clinical or scientific studies. The test has been   validated pursuant to CLIA regulations and is not   considered investigational or for research use only.  Treatment decisions should be made in consideration of   all relevant clinical and laboratory findings and the  prescribing information for the drugs.    This test was developed and its analytical performance  characteristics have been determined by [employer].  It has not been cleared or approved by FDA. This assay  has been validated pursuant to the CLIA regulations  and is used for clinical purposes.    Test Performed at:  [facility] [facility]  [address] [place]  [address]     REDD Rivas MD, PhD      OPAL obrien RNA Source 10/22/2024 THROAT   Corrected    C.trach. Misc. Amp RNA 10/22/2024 Negative  Negative Final    Comment: -------------------ADDITIONAL INFORMATION-------------------  This report is intended for use in clinical monitoring   and management of patients. It is not intended for use   in medical-legal applications.       Becky RNA Source 10/22/2024 THROAT   Corrected    N.gonorroheae,Misc. Amp RNA 10/22/2024 Negative  Negative Final    Comment: -------------------ADDITIONAL INFORMATION-------------------  This report is intended for use in clinical monitoring   and management of patients. It is not intended for use   in medical-legal applications.     Test Performed by:  [facility]  [address] [place] [address]  : Lilly Hernandez Ph.D.; CLIA# [id number]      OPAL obrien RNA Source 10/22/2024 RECTAL   Corrected    C.trach. Misc. Amp RNA 10/22/2024 Positive (A)  Negative Final    Comment: -------------------ADDITIONAL INFORMATION-------------------  This report is intended  for use in clinical monitoring   and management of patients. It is not intended for use   in medical-legal applications.       Shantelsharon, RNA Source 10/22/2024 RECTAL   Corrected    ShantelsharonMisc. Amp RNA 10/22/2024 Negative  Negative Final    Comment: -------------------ADDITIONAL INFORMATION-------------------  This report is intended for use in clinical monitoring   and management of patients. It is not intended for use   in medical-legal applications.     Test Performed by:  [facility]  [address] [place] [address]  : Lilly Hernandez Ph.D.; CLIA# [id number]      RPR Quantitative 10/22/2024 1:4 (A)   Final   Office Visit on 10/22/2024   Component Date Value Ref Range Status    HIV 1 Ab 10/08/2024 Posititive   Final    HIV 2 Ab 10/08/2024 Negative   Final    RPR Titer 10/08/2024 1:4   Final    TREPONEMA PALLIDUM AB 10/08/2024 Positive   Final         Assessment        ICD-10-CM ICD-9-CM   1. Asymptomatic HIV infection, with no history of HIV-related illness  Z21 V08   2. Class 3 severe obesity due to excess calories with serious comorbidity and body mass index (BMI) of 50.0 to 59.9 in adult  E66.813 278.01    Z68.43 V85.43    E66.01    3. Hepatitis C antibody positive in blood  R76.8 795.79   4. Syphilis  A53.9 097.9         Plan:   Assessment & Plan    IMPRESSION:  Assessed patient's response to HIV treatment with Biktarvy  Reviewed lab results: CD4 count 600, excellent kidney and liver function, negative tuberculosis test  Evaluated hepatitis C status: likely cleared infection or possible false positive  Determined no resistance to main HIV medications based on resistance test  Proceeded with syphilis treatment using Bicillin (penicillin) injections due to potential doxycycline resistance  Deferred mumps vaccination due to ongoing syphilis treatment and live vaccine concerns    HIV DISEASE:  Discussed CD4 count implications and target  range.  Educated on HIV medication resistance and its implications.  Continued Biktarvy for HIV treatment.  Viral load retest ordered.    SYPHILIS:  Informed about syphilis stages and treatment approach.  Explained rationale for Bicillin injections over doxycycline for syphilis treatment.  Started Bicillin (penicillin) injections for syphilis treatment, to be administered weekly for 3 weeks.  Administered first Bicillin injection.    HEPATITIS C ANTIBODY POSITIVE:  Explained hepatitis C test results and possible scenarios.            1. Asymptomatic HIV infection, with no history of HIV-related illness  -     HIV RNA, Quantitative, PCR; Future; Expected date: 12/04/2024  -     Comprehensive Metabolic Panel; Future; Expected date: 12/04/2024  -     CD4 T-Gasquet Cells; Future; Expected date: 12/04/2024  -     CBC Auto Differential; Future; Expected date: 12/04/2024    2. Class 3 severe obesity due to excess calories with serious comorbidity and body mass index (BMI) of 50.0 to 59.9 in adult    3. Hepatitis C antibody positive in blood    4. Syphilis             -Bert Jordan Jr., MD, AAHIVS      This note was generated with the assistance of ambient listening technology. Verbal consent was obtained by the patient and accompanying visitor(s) for the recording of patient appointment to facilitate this note. I attest to having reviewed and edited the generated note for accuracy, though some syntax or spelling errors may persist. Please contact the author of this note for any clarification.      There are no Patient Instructions on file for this visit.      Follow up in about 1 week (around 12/11/2024) for Nurse Visit- Bicillin 2.4 mill dose 2; 2 weeks Nurse Visit Bicillin 2.4 mill dose 3.   Future Appointments   Date Time Provider Department Center   12/11/2024  9:20 AM NURSE, [facility] [place] [facility]   12/18/2024  9:20 AM NURSE, [facility] [place] [facility]

## 2024-12-06 LAB
CD3+CD4+ CELLS # BLD: 742 CELLS/UL (ref 300–1400)
CD3+CD4+ CELLS NFR BLD: 26.8 % (ref 28–57)
HIV1 RNA # SERPL NAA+PROBE: <20 COPIES/ML
HIV1 RNA SERPL NAA+PROBE-LOG#: <1.3 LOG COPIES/ML
HIV1 RNA SERPL QL NAA+PROBE: DETECTED

## 2024-12-08 PROBLEM — R76.8 HEPATITIS C ANTIBODY POSITIVE IN BLOOD: Chronic | Status: ACTIVE | Noted: 2024-12-08

## 2024-12-08 PROBLEM — E66.813 CLASS 3 SEVERE OBESITY DUE TO EXCESS CALORIES WITH SERIOUS COMORBIDITY AND BODY MASS INDEX (BMI) OF 50.0 TO 59.9 IN ADULT: Chronic | Status: ACTIVE | Noted: 2024-12-08

## 2024-12-08 PROBLEM — E66.01 CLASS 3 SEVERE OBESITY DUE TO EXCESS CALORIES WITH SERIOUS COMORBIDITY AND BODY MASS INDEX (BMI) OF 50.0 TO 59.9 IN ADULT: Chronic | Status: ACTIVE | Noted: 2024-12-08

## 2024-12-11 ENCOUNTER — CLINICAL SUPPORT (OUTPATIENT)
Dept: FAMILY MEDICINE | Facility: CLINIC | Age: 29
End: 2024-12-11
Payer: COMMERCIAL

## 2024-12-11 DIAGNOSIS — A53.9 SYPHILIS: Primary | Chronic | ICD-10-CM

## 2024-12-11 PROCEDURE — 99999 PR PBB SHADOW E&M-EST. PATIENT-LVL I: CPT | Mod: PBBFAC,,,

## 2024-12-11 PROCEDURE — 96372 THER/PROPH/DIAG INJ SC/IM: CPT | Mod: S$GLB,,, | Performed by: FAMILY MEDICINE

## 2024-12-11 NOTE — PROGRESS NOTES
Second dose of Bicillin 2.4 million units given to the right dorsal gluteal IM. Instructed to wait 15 minutes per monitoring. No adverse drug reaction noted.

## 2024-12-13 DIAGNOSIS — Z21 ASYMPTOMATIC HIV INFECTION, WITH NO HISTORY OF HIV-RELATED ILLNESS: Chronic | ICD-10-CM

## 2024-12-13 NOTE — TELEPHONE ENCOUNTER
Refill Routing Note   Medication(s) are not appropriate for processing by [facility] for the following reason(s):        Outside of protocol    [facility] action(s):  Route               Appointments  past 12m or future 3m with PCP    Date Provider   Last Visit   12/4/2024 Bert Jordan Jr., MD   Next Visit   Visit date not found Bert Jordan Jr., MD   ED visits in past 90 days: 0        Note composed:4:32 PM 12/13/2024

## 2024-12-14 RX ORDER — BICTEGRAVIR SODIUM, EMTRICITABINE, AND TENOFOVIR ALAFENAMIDE FUMARATE 50; 200; 25 MG/1; MG/1; MG/1
1 TABLET ORAL DAILY
Qty: 30 TABLET | Refills: 5 | Status: ACTIVE | OUTPATIENT
Start: 2024-12-14

## 2024-12-18 ENCOUNTER — CLINICAL SUPPORT (OUTPATIENT)
Dept: FAMILY MEDICINE | Facility: CLINIC | Age: 29
End: 2024-12-18
Payer: COMMERCIAL

## 2024-12-18 DIAGNOSIS — A53.9 SYPHILIS: Primary | ICD-10-CM

## 2024-12-18 PROCEDURE — 99999 PR PBB SHADOW E&M-EST. PATIENT-LVL I: CPT | Mod: PBBFAC,,,

## 2024-12-18 PROCEDURE — 96372 THER/PROPH/DIAG INJ SC/IM: CPT | Mod: S$GLB,,, | Performed by: FAMILY MEDICINE

## 2024-12-18 NOTE — PROGRESS NOTES
Bicillin 2.4 million units given to the left dorsal gluteal IM. Instructed patient to wait 15 minutes per monitoring. No adverse drug reaction noted.

## 2025-02-19 ENCOUNTER — PATIENT MESSAGE (OUTPATIENT)
Dept: ADMINISTRATIVE | Facility: OTHER | Age: 30
End: 2025-02-19
Payer: COMMERCIAL

## 2025-03-18 ENCOUNTER — PATIENT MESSAGE (OUTPATIENT)
Dept: ADMINISTRATIVE | Facility: OTHER | Age: 30
End: 2025-03-18
Payer: COMMERCIAL

## 2025-04-20 ENCOUNTER — PATIENT MESSAGE (OUTPATIENT)
Dept: ADMINISTRATIVE | Facility: OTHER | Age: 30
End: 2025-04-20
Payer: COMMERCIAL

## 2025-05-21 ENCOUNTER — PATIENT MESSAGE (OUTPATIENT)
Dept: ADMINISTRATIVE | Facility: OTHER | Age: 30
End: 2025-05-21
Payer: COMMERCIAL

## 2025-05-21 NOTE — TELEPHONE ENCOUNTER
Outgoing call regarding patient's questionnaire as refill had been set up prior to questionnaire for different date. Patient reported delivery date in questionnaire was wrong and confirmed delivery date of 5/28 was correct.

## 2025-06-19 DIAGNOSIS — Z21 ASYMPTOMATIC HIV INFECTION, WITH NO HISTORY OF HIV-RELATED ILLNESS: Chronic | ICD-10-CM

## 2025-06-20 DIAGNOSIS — Z21 ASYMPTOMATIC HIV INFECTION, WITH NO HISTORY OF HIV-RELATED ILLNESS: Chronic | ICD-10-CM

## 2025-06-20 DIAGNOSIS — Z20.2 CONTACT WITH AND (SUSPECTED) EXPOSURE TO INFECTIONS WITH A PREDOMINANTLY SEXUAL MODE OF TRANSMISSION: ICD-10-CM

## 2025-06-20 DIAGNOSIS — A53.9 SYPHILIS: Chronic | ICD-10-CM

## 2025-06-20 RX ORDER — BICTEGRAVIR SODIUM, EMTRICITABINE, AND TENOFOVIR ALAFENAMIDE FUMARATE 50; 200; 25 MG/1; MG/1; MG/1
1 TABLET ORAL DAILY
Qty: 30 TABLET | Refills: 1 | Status: SHIPPED | OUTPATIENT
Start: 2025-06-20

## 2025-06-23 ENCOUNTER — TELEPHONE (OUTPATIENT)
Dept: FAMILY MEDICINE | Facility: CLINIC | Age: 30
End: 2025-06-23
Payer: COMMERCIAL

## 2025-06-26 ENCOUNTER — LAB VISIT (OUTPATIENT)
Dept: LAB | Facility: HOSPITAL | Age: 30
End: 2025-06-26
Attending: FAMILY MEDICINE
Payer: COMMERCIAL

## 2025-06-26 DIAGNOSIS — Z21 ASYMPTOMATIC HIV INFECTION, WITH NO HISTORY OF HIV-RELATED ILLNESS: ICD-10-CM

## 2025-06-26 DIAGNOSIS — A53.9 SYPHILIS: Chronic | ICD-10-CM

## 2025-06-26 DIAGNOSIS — Z20.2 CONTACT WITH AND (SUSPECTED) EXPOSURE TO INFECTIONS WITH A PREDOMINANTLY SEXUAL MODE OF TRANSMISSION: ICD-10-CM

## 2025-06-26 LAB
ABSOLUTE EOSINOPHIL (OHS): 0.24 K/UL
ABSOLUTE MONOCYTE (OHS): 0.59 K/UL (ref 0.3–1)
ABSOLUTE NEUTROPHIL COUNT (OHS): 3.26 K/UL (ref 1.8–7.7)
ALBUMIN SERPL BCP-MCNC: 3.8 G/DL (ref 3.5–5.2)
ALP SERPL-CCNC: 54 UNIT/L (ref 40–150)
ALT SERPL W/O P-5'-P-CCNC: 15 UNIT/L (ref 10–44)
ANION GAP (OHS): 10 MMOL/L (ref 8–16)
AST SERPL-CCNC: 13 UNIT/L (ref 11–45)
BASOPHILS # BLD AUTO: 0.02 K/UL
BASOPHILS NFR BLD AUTO: 0.3 %
BILIRUB SERPL-MCNC: 0.5 MG/DL (ref 0.1–1)
BUN SERPL-MCNC: 14 MG/DL (ref 6–20)
CALCIUM SERPL-MCNC: 9.3 MG/DL (ref 8.7–10.5)
CHLORIDE SERPL-SCNC: 102 MMOL/L (ref 95–110)
CO2 SERPL-SCNC: 27 MMOL/L (ref 23–29)
CREAT SERPL-MCNC: 0.8 MG/DL (ref 0.5–1.4)
ERYTHROCYTE [DISTWIDTH] IN BLOOD BY AUTOMATED COUNT: 13.1 % (ref 11.5–14.5)
GFR SERPLBLD CREATININE-BSD FMLA CKD-EPI: >60 ML/MIN/1.73/M2
GLUCOSE SERPL-MCNC: 101 MG/DL (ref 70–110)
HCT VFR BLD AUTO: 41.1 % (ref 40–54)
HGB BLD-MCNC: 14 GM/DL (ref 14–18)
IMM GRANULOCYTES # BLD AUTO: 0.01 K/UL (ref 0–0.04)
IMM GRANULOCYTES NFR BLD AUTO: 0.1 % (ref 0–0.5)
LYMPHOCYTES # BLD AUTO: 3.32 K/UL (ref 1–4.8)
MCH RBC QN AUTO: 30.9 PG (ref 27–31)
MCHC RBC AUTO-ENTMCNC: 34.1 G/DL (ref 32–36)
MCV RBC AUTO: 91 FL (ref 82–98)
NUCLEATED RBC (/100WBC) (OHS): 0 /100 WBC
PLATELET # BLD AUTO: 244 K/UL (ref 150–450)
PMV BLD AUTO: 10.7 FL (ref 9.2–12.9)
POTASSIUM SERPL-SCNC: 3.8 MMOL/L (ref 3.5–5.1)
PROT SERPL-MCNC: 8.2 GM/DL (ref 6–8.4)
RBC # BLD AUTO: 4.53 M/UL (ref 4.6–6.2)
RELATIVE EOSINOPHIL (OHS): 3.2 %
RELATIVE LYMPHOCYTE (OHS): 44.6 % (ref 18–48)
RELATIVE MONOCYTE (OHS): 7.9 % (ref 4–15)
RELATIVE NEUTROPHIL (OHS): 43.9 % (ref 38–73)
SODIUM SERPL-SCNC: 139 MMOL/L (ref 136–145)
WBC # BLD AUTO: 7.44 K/UL (ref 3.9–12.7)

## 2025-06-26 PROCEDURE — 87536 HIV-1 QUANT&REVRSE TRNSCRPJ: CPT

## 2025-06-26 PROCEDURE — 85025 COMPLETE CBC W/AUTO DIFF WBC: CPT

## 2025-06-26 PROCEDURE — 86361 T CELL ABSOLUTE COUNT: CPT

## 2025-06-26 PROCEDURE — 36415 COLL VENOUS BLD VENIPUNCTURE: CPT | Mod: PN

## 2025-06-26 PROCEDURE — 84075 ASSAY ALKALINE PHOSPHATASE: CPT

## 2025-06-26 PROCEDURE — 86592 SYPHILIS TEST NON-TREP QUAL: CPT

## 2025-06-27 LAB
CD3+CD4+ CELLS # SPEC: 994 CELLS/UL (ref 300–1400)
CD3+CD4+ CELLS NFR BLD: 26.48 % (ref 28–57)
HIV1 RNA SERPL NAA+PROBE-LOG#: NOT DETECTED {LOG_COPIES}/ML
LABORATORY COMMENT REPORT: ABNORMAL

## 2025-06-28 LAB — RPR SER QL: NORMAL

## 2025-06-29 ENCOUNTER — RESULTS FOLLOW-UP (OUTPATIENT)
Dept: FAMILY MEDICINE | Facility: CLINIC | Age: 30
End: 2025-06-29

## 2025-07-02 ENCOUNTER — OFFICE VISIT (OUTPATIENT)
Dept: FAMILY MEDICINE | Facility: CLINIC | Age: 30
End: 2025-07-02
Payer: COMMERCIAL

## 2025-07-02 VITALS
DIASTOLIC BLOOD PRESSURE: 76 MMHG | SYSTOLIC BLOOD PRESSURE: 114 MMHG | WEIGHT: 315 LBS | HEART RATE: 81 BPM | BODY MASS INDEX: 50.62 KG/M2 | OXYGEN SATURATION: 98 % | RESPIRATION RATE: 19 BRPM | TEMPERATURE: 98 F | HEIGHT: 66 IN

## 2025-07-02 DIAGNOSIS — R06.83 SNORING: ICD-10-CM

## 2025-07-02 DIAGNOSIS — Z21 ASYMPTOMATIC HIV INFECTION, WITH NO HISTORY OF HIV-RELATED ILLNESS: Primary | Chronic | ICD-10-CM

## 2025-07-02 DIAGNOSIS — Z20.2 CONTACT WITH AND (SUSPECTED) EXPOSURE TO INFECTIONS WITH A PREDOMINANTLY SEXUAL MODE OF TRANSMISSION: ICD-10-CM

## 2025-07-02 PROCEDURE — 3074F SYST BP LT 130 MM HG: CPT | Mod: CPTII,S$GLB,, | Performed by: FAMILY MEDICINE

## 2025-07-02 PROCEDURE — 1160F RVW MEDS BY RX/DR IN RCRD: CPT | Mod: CPTII,S$GLB,, | Performed by: FAMILY MEDICINE

## 2025-07-02 PROCEDURE — 1159F MED LIST DOCD IN RCRD: CPT | Mod: CPTII,S$GLB,, | Performed by: FAMILY MEDICINE

## 2025-07-02 PROCEDURE — 3008F BODY MASS INDEX DOCD: CPT | Mod: CPTII,S$GLB,, | Performed by: FAMILY MEDICINE

## 2025-07-02 PROCEDURE — 99213 OFFICE O/P EST LOW 20 MIN: CPT | Mod: S$GLB,,, | Performed by: FAMILY MEDICINE

## 2025-07-02 PROCEDURE — 99999 PR PBB SHADOW E&M-EST. PATIENT-LVL V: CPT | Mod: PBBFAC,,, | Performed by: FAMILY MEDICINE

## 2025-07-02 PROCEDURE — 3078F DIAST BP <80 MM HG: CPT | Mod: CPTII,S$GLB,, | Performed by: FAMILY MEDICINE

## 2025-07-02 RX ORDER — FLUTICASONE PROPIONATE 50 MCG
SPRAY, SUSPENSION (ML) NASAL
COMMUNITY
Start: 2025-04-20

## 2025-07-02 RX ORDER — GLYCOPYRRONIUM 2.4 G/100G
CLOTH TOPICAL
COMMUNITY

## 2025-07-02 RX ORDER — VALACYCLOVIR HYDROCHLORIDE 1 G/1
1000 TABLET, FILM COATED ORAL 3 TIMES DAILY
COMMUNITY
Start: 2025-04-20

## 2025-07-02 NOTE — PROGRESS NOTES
"  Patient Name: Cherie Moya    : 1995  MRN: [id number]      Subjective:     Patient ID: Cherie is a 30 y.o. male    Chief Complaint:  HIV Positive/AIDS    History of Present Illness    Cherie presents today for follow up of HIV management.    He reports excellent response to HIV management with Biktarvy, taking medication with no side effects or concerns.     He has a history of sleep apnea diagnosed at age 12 following tonsil and adenoid removal. He currently experiences ongoing snoring with reported breathing pauses during sleep, as noted by his mother. He acknowledges intermittent cessation of breathing during sleep and recognizes potential recurrence of previous sleep apnea symptoms after regrowth of adenoids. He seeks evaluation and management of current sleep-related concerns.    ROS:  General: -fever, -chills, -fatigue, -weight gain, -weight loss  Eyes: -vision changes, -redness, -discharge  ENT: -ear pain, -nasal congestion, -sore throat  Cardiovascular: -chest pain, -palpitations, -lower extremity edema  Respiratory: -cough, -shortness of breath, +snoring, +apnea, +intermittent breathing while sleeping  Gastrointestinal: -abdominal pain, -nausea, -vomiting, -diarrhea, -constipation, -blood in stool  Genitourinary: -dysuria, -hematuria, -frequency  Musculoskeletal: -joint pain, -muscle pain  Skin: -rash, -lesion  Neurological: -headache, -dizziness, -numbness, -tingling  Psychiatric: -anxiety, -depression, -sleep difficulty           Objective:   /76 (BP Location: Left arm, Patient Position: Sitting)   Pulse 81   Temp 98 °F (36.7 °C) (Oral)   Resp 19   Ht 5' 6" (1.676 m)   Wt (!) 148.4 kg (327 lb 2.6 oz)   SpO2 98%   BMI 52.81 kg/m²     Physical Exam  Vitals reviewed.   Constitutional:       General: He is not in acute distress.  HENT:      Head: Normocephalic and atraumatic.      Right Ear: Ear canal and external ear normal.      Left Ear: Ear canal and external ear normal.      Nose: " Nose normal.      Mouth/Throat:      Mouth: Mucous membranes are moist.      Pharynx: No oropharyngeal exudate or posterior oropharyngeal erythema.   Eyes:      Extraocular Movements: Extraocular movements intact.      Conjunctiva/sclera: Conjunctivae normal.      Pupils: Pupils are equal, round, and reactive to light.   Cardiovascular:      Rate and Rhythm: Normal rate and regular rhythm.      Pulses: Normal pulses.      Heart sounds: Normal heart sounds.   Pulmonary:      Effort: Pulmonary effort is normal. No respiratory distress.      Breath sounds: No wheezing or rales.   Abdominal:      General: Abdomen is flat. Bowel sounds are normal. There is no distension.      Palpations: Abdomen is soft.      Tenderness: There is no abdominal tenderness. There is no guarding.   Musculoskeletal:      Cervical back: Normal range of motion. No rigidity or tenderness.   Lymphadenopathy:      Cervical: No cervical adenopathy.   Skin:     General: Skin is warm.      Capillary Refill: Capillary refill takes less than 2 seconds.   Neurological:      Mental Status: He is alert and oriented to person, place, and time.      Cranial Nerves: No cranial nerve deficit.      Sensory: No sensory deficit.   Psychiatric:         Mood and Affect: Mood normal.         Behavior: Behavior normal.        Lab Visit on 06/26/2025   Component Date Value Ref Range Status    CTGC Source 06/26/2025 Urine   Final    Chlamydia, Amplified DNA 06/26/2025 Not Detected  Not Detected Final    N gonorrhoeae, amplified DNA 06/26/2025 Not Detected  Not Detected Final   Lab Visit on 06/26/2025   Component Date Value Ref Range Status    HIV-1 RNA, Qualitative 06/26/2025 Not Detected  Not Detected Final    Sodium 06/26/2025 139  136 - 145 mmol/L Final    Potassium 06/26/2025 3.8  3.5 - 5.1 mmol/L Final    Chloride 06/26/2025 102  95 - 110 mmol/L Final    CO2 06/26/2025 27  23 - 29 mmol/L Final    Glucose 06/26/2025 101  70 - 110 mg/dL Final    BUN 06/26/2025 14   6 - 20 mg/dL Final    Creatinine 06/26/2025 0.8  0.5 - 1.4 mg/dL Final    Calcium 06/26/2025 9.3  8.7 - 10.5 mg/dL Final    Protein Total 06/26/2025 8.2  6.0 - 8.4 gm/dL Final    Albumin 06/26/2025 3.8  3.5 - 5.2 g/dL Final    Bilirubin Total 06/26/2025 0.5  0.1 - 1.0 mg/dL Final    For infants and newborns, interpretation of results should be based   on gestational age, weight and in agreement with clinical   observations.    Premature Infant recommended reference ranges:   0-24 hours:  <8.0 mg/dL   24-48 hours: <12.0 mg/dL   3-5 days:    <15.0 mg/dL   6-29 days:   <15.0 mg/dL    ALP 06/26/2025 54  40 - 150 unit/L Final    AST 06/26/2025 13  11 - 45 unit/L Final    ALT 06/26/2025 15  10 - 44 unit/L Final    Anion Gap 06/26/2025 10  8 - 16 mmol/L Final    eGFR 06/26/2025 >60  >60 mL/min/1.73/m2 Final    Estimated GFR calculated using the CKD-EPI creatinine (2021) equation.    CD4 % 06/26/2025 26.48 (L)  28 - 57 % Final    CD4 Absolute 06/26/2025 994  300 - 1,400 Cells/uL Final    Disclaimer Statement 06/26/2025    Final                    Value:This test was developed and its performance characteristics determined by the [facility] Department of Pathology and Laboratory Medicine. It has not been cleared or approved by the U.S. Food and Drug Administration. The FDA has determined that such clearance or approval is not necessary. This test is used for clinical purposes. It should not be regarded as investigational or for research. This laboratory is certified under CLIA-88 as qualified to perform high complexity clinical laboratory testing.       RPR Monitoring 06/26/2025 Non-Reactive  Non-Reactive, Equivocal, Weakly-reactive Final    WBC 06/26/2025 7.44  3.90 - 12.70 K/uL Final    RBC 06/26/2025 4.53 (L)  4.60 - 6.20 M/uL Final    HGB 06/26/2025 14.0  14.0 - 18.0 gm/dL Final    HCT 06/26/2025 41.1  40.0 - 54.0 % Final    MCV 06/26/2025 91  82 - 98 fL Final    MCH 06/26/2025 30.9  27.0 - 31.0 pg Final     MCHC 06/26/2025 34.1  32.0 - 36.0 g/dL Final    RDW 06/26/2025 13.1  11.5 - 14.5 % Final    Platelet Count 06/26/2025 244  150 - 450 K/uL Final    MPV 06/26/2025 10.7  9.2 - 12.9 fL Final    Nucleated RBC 06/26/2025 0  <=0 /100 WBC Final    Neut % 06/26/2025 43.9  38 - 73 % Final    Lymph % 06/26/2025 44.6  18 - 48 % Final    Mono % 06/26/2025 7.9  4 - 15 % Final    Eos % 06/26/2025 3.2  <=8 % Final    Basophil % 06/26/2025 0.3  <=1.9 % Final    Imm Grans % 06/26/2025 0.1  0.0 - 0.5 % Final    Neut # 06/26/2025 3.26  1.8 - 7.7 K/uL Final    Lymph # 06/26/2025 3.32  1 - 4.8 K/uL Final    Mono # 06/26/2025 0.59  0.3 - 1 K/uL Final    Eos # 06/26/2025 0.24  <=0.5 K/uL Final    Baso # 06/26/2025 0.02  <=0.2 K/uL Final    Imm Grans # 06/26/2025 0.01  0.00 - 0.04 K/uL Final    Mild elevation in immature granulocytes is non specific and can be seen in a variety of conditions including stress response, acute inflammation, trauma and pregnancy. Correlation with other laboratory and clinical findings is essential.        Assessment        ICD-10-CM ICD-9-CM   1. Asymptomatic HIV infection, with no history of HIV-related illness  Z21 V08   2. Contact with and (suspected) exposure to infections with a predominantly sexual mode of transmission  Z20.2 V01.6   3. Snoring  R06.83 786.09         Plan:   Assessment & Plan            1. Asymptomatic HIV infection, with no history of HIV-related illness  -     HIV RNA, Quantitative, PCR; Future; Expected date: 07/02/2025  -     Comprehensive Metabolic Panel; Future; Expected date: 07/02/2025  -     CD4 T-Nettie Cells; Future; Expected date: 07/02/2025  -     CBC Auto Differential; Future; Expected date: 07/02/2025  -     RPR (for monitoring); Future; Expected date: 07/02/2025  -     C. trachomatis/N. gonorrhoeae by AMP DNA; Future; Expected date: 07/02/2025  HIV well controlled.  Continue Biktarvy    2. Contact with and (suspected) exposure to infections with a predominantly sexual  mode of transmission  -     RPR (for monitoring); Future; Expected date: 07/02/2025  -     C. trachomatis/N. gonorrhoeae by AMP DNA; Future; Expected date: 07/02/2025    3. Snoring  -     Ambulatory referral/consult to Sleep Disorders; Future; Expected date: 07/09/2025  Referral to sleep medicine.           -Bert Jordan Jr., MD, AAHIVS      This note was generated with the assistance of ambient listening technology. Verbal consent was obtained by the patient and accompanying visitor(s) for the recording of patient appointment to facilitate this note. I attest to having reviewed and edited the generated note for accuracy, though some syntax or spelling errors may persist. Please contact the author of this note for any clarification.      Patient Instructions   Please call referral team at [phone] to schedule referrals       Follow up in about 6 months (around 1/2/2026) for Annual (labs a week prior).   Future Appointments   Date Time Provider Department Center   12/26/2025  7:30 AM LAB, [facility]   12/26/2025  7:45 AM LAB, [facility]   1/2/2026  9:40 AM Bert Jordan Jr., MD [facility]

## 2025-08-22 DIAGNOSIS — Z21 ASYMPTOMATIC HIV INFECTION, WITH NO HISTORY OF HIV-RELATED ILLNESS: Chronic | ICD-10-CM

## 2025-08-22 RX ORDER — BICTEGRAVIR SODIUM, EMTRICITABINE, AND TENOFOVIR ALAFENAMIDE FUMARATE 50; 200; 25 MG/1; MG/1; MG/1
1 TABLET ORAL DAILY
Qty: 30 TABLET | Refills: 5 | Status: ACTIVE | OUTPATIENT
Start: 2025-08-22

## 2025-08-27 ENCOUNTER — OFFICE VISIT (OUTPATIENT)
Dept: SLEEP MEDICINE | Facility: CLINIC | Age: 30
End: 2025-08-27
Payer: COMMERCIAL

## 2025-08-27 VITALS — HEIGHT: 66 IN | WEIGHT: 315 LBS | BODY MASS INDEX: 50.62 KG/M2

## 2025-08-27 DIAGNOSIS — G47.33 OSA (OBSTRUCTIVE SLEEP APNEA): Primary | ICD-10-CM

## 2025-08-27 DIAGNOSIS — F51.09 OTHER INSOMNIA NOT DUE TO A SUBSTANCE OR KNOWN PHYSIOLOGICAL CONDITION: ICD-10-CM

## 2025-08-27 DIAGNOSIS — R35.1 NOCTURIA: ICD-10-CM

## 2025-08-27 DIAGNOSIS — G47.10 HYPERSOMNOLENCE: ICD-10-CM

## 2025-08-27 PROCEDURE — 99204 OFFICE O/P NEW MOD 45 MIN: CPT | Mod: S$GLB,,, | Performed by: PHYSICIAN ASSISTANT

## 2025-08-27 PROCEDURE — 99999 PR PBB SHADOW E&M-EST. PATIENT-LVL III: CPT | Mod: PBBFAC,,, | Performed by: PHYSICIAN ASSISTANT

## 2025-08-27 PROCEDURE — 1160F RVW MEDS BY RX/DR IN RCRD: CPT | Mod: CPTII,S$GLB,, | Performed by: PHYSICIAN ASSISTANT

## 2025-08-27 PROCEDURE — 3008F BODY MASS INDEX DOCD: CPT | Mod: CPTII,S$GLB,, | Performed by: PHYSICIAN ASSISTANT

## 2025-08-27 PROCEDURE — 1159F MED LIST DOCD IN RCRD: CPT | Mod: CPTII,S$GLB,, | Performed by: PHYSICIAN ASSISTANT
